# Patient Record
Sex: FEMALE | Race: OTHER | HISPANIC OR LATINO | URBAN - METROPOLITAN AREA
[De-identification: names, ages, dates, MRNs, and addresses within clinical notes are randomized per-mention and may not be internally consistent; named-entity substitution may affect disease eponyms.]

---

## 2021-08-17 ENCOUNTER — TELEPHONE ENCOUNTER (OUTPATIENT)
Dept: URBAN - METROPOLITAN AREA CLINIC 9 | Facility: CLINIC | Age: 51
End: 2021-08-17

## 2021-09-01 ENCOUNTER — OFFICE VISIT (OUTPATIENT)
Age: 51
End: 2021-09-01

## 2021-09-09 ENCOUNTER — OFFICE VISIT (OUTPATIENT)
Dept: URBAN - METROPOLITAN AREA CLINIC 9 | Facility: CLINIC | Age: 51
End: 2021-09-09

## 2021-09-09 ENCOUNTER — TELEPHONE ENCOUNTER (OUTPATIENT)
Dept: URBAN - METROPOLITAN AREA CLINIC 9 | Facility: CLINIC | Age: 51
End: 2021-09-09

## 2021-10-08 ENCOUNTER — TELEPHONE ENCOUNTER (OUTPATIENT)
Dept: URBAN - METROPOLITAN AREA CLINIC 9 | Facility: CLINIC | Age: 51
End: 2021-10-08

## 2021-10-14 ENCOUNTER — OFFICE VISIT (OUTPATIENT)
Dept: URBAN - METROPOLITAN AREA SURGERY CENTER 9 | Facility: SURGERY CENTER | Age: 51
End: 2021-10-14

## 2021-10-28 ENCOUNTER — OFFICE VISIT (OUTPATIENT)
Dept: URBAN - METROPOLITAN AREA CLINIC 9 | Facility: CLINIC | Age: 51
End: 2021-10-28

## 2022-07-09 ENCOUNTER — TELEPHONE ENCOUNTER (OUTPATIENT)
Dept: URBAN - METROPOLITAN AREA CLINIC 121 | Facility: CLINIC | Age: 52
End: 2022-07-09

## 2022-07-09 RX ORDER — OMEPRAZOLE 40 MG/1
CAPSULE, DELAYED RELEASE ORAL
Refills: 0 | OUTPATIENT
Start: 2017-08-23 | End: 2019-07-08

## 2022-07-09 RX ORDER — OMEPRAZOLE 40 MG/1
TAKE 1 CAPSULE TWICE A DAY CAPSULE, DELAYED RELEASE ORAL
Refills: 0 | OUTPATIENT
Start: 2017-08-22 | End: 2017-08-23

## 2022-07-09 RX ORDER — OMEPRAZOLE 40 MG/1
TWICE A DAY CAPSULE, DELAYED RELEASE ORAL TWICE A DAY
Refills: 0 | OUTPATIENT
Start: 2017-07-25 | End: 2017-08-22

## 2022-07-09 RX ORDER — SUCRALFATE 1 G/1
FOUR TIMES A DAY TABLET ORAL
Refills: 1 | OUTPATIENT
Start: 2017-06-29 | End: 2017-07-25

## 2022-07-09 RX ORDER — CYCLOBENZAPRINE HYDROCHLORIDE 15 MG/1
CAPSULE, EXTENDED RELEASE ORAL
Refills: 0 | OUTPATIENT
Start: 2017-06-29 | End: 2017-07-25

## 2022-07-09 RX ORDER — OMEPRAZOLE 20 MG/1
TWICE A DAY CAPSULE, DELAYED RELEASE ORAL TWICE A DAY
Refills: 3 | OUTPATIENT
Start: 2017-06-29 | End: 2017-07-25

## 2022-07-09 RX ORDER — SUCRALFATE 1 G/1
TABLET ORAL
Refills: 0 | OUTPATIENT
Start: 2017-07-25 | End: 2017-08-23

## 2022-07-09 RX ORDER — OMEPRAZOLE 20 MG/1
TABLET, DELAYED RELEASE ORAL TWICE A DAY
Refills: 0 | OUTPATIENT
Start: 2017-07-25 | End: 2017-08-23

## 2022-07-10 ENCOUNTER — TELEPHONE ENCOUNTER (OUTPATIENT)
Dept: URBAN - METROPOLITAN AREA CLINIC 121 | Facility: CLINIC | Age: 52
End: 2022-07-10

## 2022-07-10 RX ORDER — CLARITHROMYCIN 500 MG/1
TWICE A DAY TABLET ORAL TWICE A DAY
Refills: 0 | Status: ACTIVE | COMMUNITY
Start: 2017-07-25

## 2022-07-10 RX ORDER — AMOXICILLIN 500 MG/1
2 TWICE A DAY CAPSULE ORAL
Refills: 0 | Status: ACTIVE | COMMUNITY
Start: 2017-07-25

## 2022-07-10 RX ORDER — SUCRALFATE 1 G/1
TWICE A DAY TABLET ORAL TWICE A DAY
Refills: 0 | Status: ACTIVE | COMMUNITY
Start: 2019-07-08

## 2022-07-30 ENCOUNTER — TELEPHONE ENCOUNTER (OUTPATIENT)
Age: 52
End: 2022-07-30

## 2022-07-31 ENCOUNTER — TELEPHONE ENCOUNTER (OUTPATIENT)
Age: 52
End: 2022-07-31

## 2024-06-20 ENCOUNTER — DASHBOARD ENCOUNTERS (OUTPATIENT)
Age: 54
End: 2024-06-20

## 2024-06-20 ENCOUNTER — LAB OUTSIDE AN ENCOUNTER (OUTPATIENT)
Dept: URBAN - METROPOLITAN AREA CLINIC 60 | Facility: CLINIC | Age: 54
End: 2024-06-20

## 2024-06-20 ENCOUNTER — OFFICE VISIT (OUTPATIENT)
Dept: URBAN - METROPOLITAN AREA CLINIC 60 | Facility: CLINIC | Age: 54
End: 2024-06-20
Payer: COMMERCIAL

## 2024-06-20 VITALS
OXYGEN SATURATION: 98 % | RESPIRATION RATE: 20 BRPM | SYSTOLIC BLOOD PRESSURE: 130 MMHG | BODY MASS INDEX: 34.09 KG/M2 | DIASTOLIC BLOOD PRESSURE: 80 MMHG | TEMPERATURE: 97.4 F | HEIGHT: 57 IN | HEART RATE: 63 BPM | WEIGHT: 158 LBS

## 2024-06-20 DIAGNOSIS — K29.60 REFLUX GASTRITIS: ICD-10-CM

## 2024-06-20 DIAGNOSIS — K76.0 FATTY LIVER: ICD-10-CM

## 2024-06-20 PROCEDURE — 99204 OFFICE O/P NEW MOD 45 MIN: CPT | Performed by: NURSE PRACTITIONER

## 2024-06-20 RX ORDER — ESOMEPRAZOLE MAGNESIUM 40 MG/1
1 CAPSULE CAPSULE, DELAYED RELEASE ORAL ONCE A DAY
Qty: 30 | Refills: 2 | OUTPATIENT
Start: 2024-06-20

## 2024-06-20 RX ORDER — ERGOCALCIFEROL 1.25 MG/1
1 CAPSULE CAPSULE ORAL WEEKLY
Status: ACTIVE | COMMUNITY

## 2024-06-20 RX ORDER — LEVOTHYROXINE SODIUM 100 UG/1
1 TABLET IN THE MORNING ON AN EMPTY STOMACH TABLET ORAL ONCE A DAY
Status: ACTIVE | COMMUNITY

## 2024-06-20 NOTE — HPI-TODAY'S VISIT:
6/24 Patient is here today complaining of having right upper quadrant abdominal pain, symptom of gastritis and reflux.  Also, complaining of recently being diagnosed with fatty liver.  Patient has medical history of cholecystectomy, gastritis, and reflux.  Last EGD was done more than 5 years ago as per her report.  I do not have any record of her.  The rib cage.  Liver workup.  Physical exam there is pain over the right border of the Probably corresponding with osteochondritis. Patient will resume diet for gastritis and reflux will have omeprazole 40 mg once a day and EGD.

## 2024-06-27 ENCOUNTER — LAB OUTSIDE AN ENCOUNTER (OUTPATIENT)
Dept: URBAN - METROPOLITAN AREA CLINIC 60 | Facility: CLINIC | Age: 54
End: 2024-06-27

## 2024-08-02 ENCOUNTER — LAB OUTSIDE AN ENCOUNTER (OUTPATIENT)
Dept: URBAN - METROPOLITAN AREA CLINIC 63 | Facility: CLINIC | Age: 54
End: 2024-08-02